# Patient Record
Sex: MALE | Race: ASIAN | Employment: FULL TIME | URBAN - METROPOLITAN AREA
[De-identification: names, ages, dates, MRNs, and addresses within clinical notes are randomized per-mention and may not be internally consistent; named-entity substitution may affect disease eponyms.]

---

## 2017-11-02 ENCOUNTER — HOSPITAL ENCOUNTER (OUTPATIENT)
Dept: PHYSICAL THERAPY | Age: 22
Discharge: HOME | End: 2017-11-02
Payer: COMMERCIAL

## 2017-11-02 PROCEDURE — 97161 PT EVAL LOW COMPLEX 20 MIN: CPT | Performed by: PHYSICAL THERAPIST

## 2017-11-02 NOTE — PROGRESS NOTES
[facility] PH[facility]ICAL [facility] - DAILY TREATMENT NOTE    Patient Name: Giorgi Bond        Date: 2017  : 1995   yes Patient  Verified  Visit #:   1   of   8  Insurance: Payor: Wilver Dubois / Plan: Jacinta Peace PPO / Product Type: PPO /      In time: 600 Out time: 630   Total Treatment Time: 30     Medicare Time Tracking (below)   Total Timed Codes (min):  na 1:1 Treatment Time:  na     TREATMENT AREA =  Low back pain [M54.5]  Right hip pain [M25.551]    SUBJECTIVE  Pain Level (on 0 to 10 scale):  ie  / 10   Medication Changes/New allergies or changes in medical history, any new surgeries or procedures?    no  If yes, update Summary List   Subjective Functional Status/Changes:  []  No changes reported     See ie          OBJECTIVE       min Patient Education:  yes  Reviewed HEP   []  Progressed/Changed HEP based on: Other Objective/Functional Measures:    Demo hep without an increase in pain  See ie     Post Treatment Pain Level (on 0 to 10) scale:   ie  / 10     ASSESSMENT  Assessment/Changes in Function:     See ie     []  See Progress Note/Recertification   Patient will continue to benefit from skilled PT services to modify and progress therapeutic interventions, address functional mobility deficits, address ROM deficits, address strength deficits, analyze and address soft tissue restrictions, analyze and cue movement patterns, analyze and modify body mechanics/ergonomics, assess and modify postural abnormalities and instruct in home and community integration to attain remaining goals.    Progress toward goals / Updated goals:    See ie     PLAN  []  Upgrade activities as tolerated yes Continue plan of care   []  Discharge due to :    []  Other:      Therapist: Vinnie Burgess PT    Date: 2017 Time: 6:37 PM     Future Appointments  Date Time Provider Jake Steel   2017 5:30 PM Krystina Phan PTA [facility]   2017 4:30 PM Krystina Phan PTA [facility]   2017 5:00 PM Christina Moreno PT [facility]   11/28/2017 5:00 PM Janine Huggins, PT [facility]   11/29/2017 4:30 PM Renu Davis, PTA [facility]

## 2017-11-08 ENCOUNTER — HOSPITAL ENCOUNTER (OUTPATIENT)
Dept: PHYSICAL THERAPY | Age: 22
Discharge: HOME | End: 2017-11-08
Payer: COMMERCIAL

## 2017-11-08 PROCEDURE — 97140 MANUAL THERAPY 1/> REGIONS: CPT

## 2017-11-08 PROCEDURE — 97110 THERAPEUTIC EXERCISES: CPT

## 2017-11-09 NOTE — PROGRESS NOTES
PHYSICAL THERAPY - DAILY TREATMENT NOTE    Patient Name: Raudel Camacho        Date: 2017  : 1995   YES Patient  Verified  Visit #:   2   of   12  Insurance: Payor: Jocelin Heredia / Plan: Eddi Leija PPO / Product Type: PPO /      In time: 525 Out time: 625   Total Treatment Time: 60     Medicare Time Tracking (below)   Total Timed Codes (min):  50 1:1 Treatment Time:       TREATMENT AREA =  Low back pain [M54.5]  Right hip pain [M25.551]    SUBJECTIVE  Pain Level (on 0 to 10 scale):  2  / 10   Medication Changes/New allergies or changes in medical history, any new surgeries or procedures? NO    If yes, update Summary List   Subjective Functional Status/Changes:  []  No changes reported     Pain reported @ mid low back, into (R) side and lateral hip. OBJECTIVE  Modalities Rationale:     decrease pain and increase tissue extensibility to improve patient's ability to perform pain free ADLs. min [] Estim, type/location:                                      []  att     []  unatt     []  w/US     []  w/ice    []  w/heat    min []  Mechanical Traction: type/lbs                   []  pro   []  sup   []  int   []  cont    []  before manual    []  after manual    min []  Ultrasound, settings/location:      min []  Iontophoresis w/ dexamethasone, location:                                               []  take home patch       []  in clinic   10 min []  Ice     [x]  Heat    location/position: Prone, lumbar. min []  Vasopneumatic Device, press/temp:     min []  Other:    [x] Skin assessment post-treatment (if applicable):    [x]  intact    []  redness- no adverse reaction     []redness  adverse reaction:        30 min Therapeutic Exercise:  [x]  See flow sheet   Rationale:      increase ROM, increase strength and improve coordination to improve the patients ability to perform pain free ADLs. 20 Min Manual Therapy: STM/DTM and TPR (R) lumbar paraspinals, glute/piriformis.     Rationale:      decrease pain, increase ROM, increase tissue extensibility and decrease trigger points to improve patient's ability to perform pain free ADLs. min Patient Education:  YES  Reviewed HEP   []  Progressed/Changed HEP based on: Other Objective/Functional Measures: Added multiple exercises to improve LE strength and stability for ADLs. Post Treatment Pain Level (on 0 to 10) scale:   2  / 10     ASSESSMENT  Assessment/Changes in Function:     TTP (B) lumbar paraspinals. Reports compliance with HEP. []  See Progress Note/Recertification   Patient will continue to benefit from skilled PT services to modify and progress therapeutic interventions, address functional mobility deficits, address ROM deficits, address strength deficits, analyze and address soft tissue restrictions, analyze and cue movement patterns, analyze and modify body mechanics/ergonomics and assess and modify postural abnormalities to attain remaining goals. Progress toward goals / Updated goals:    Initiated therex.       PLAN  [x]  Upgrade activities as tolerated YES Continue plan of care   []  Discharge due to :    []  Other:      Therapist: Cirilo Chavez PTA    Date: 11/8/2017 Time: 7:09 PM     Future Appointments  Date Time Provider Jake Steel   11/20/2017 4:30 PM Alexa Knapp [facility]   11/21/2017 5:00 PM Carlos Merchant PT [facility]   11/28/2017 5:00 PM Carlos Merchant PT [facility]   11/29/2017 4:30 PM Cirilo Chavez PTA [facility]

## 2017-11-20 ENCOUNTER — HOSPITAL ENCOUNTER (OUTPATIENT)
Dept: PHYSICAL THERAPY | Age: 22
Discharge: HOME | End: 2017-11-20
Payer: COMMERCIAL

## 2017-11-20 PROCEDURE — 97110 THERAPEUTIC EXERCISES: CPT

## 2017-11-20 PROCEDURE — 97140 MANUAL THERAPY 1/> REGIONS: CPT

## 2017-11-20 NOTE — PROGRESS NOTES
PHYSICAL THERAPY - DAILY TREATMENT NOTE    Patient Name: Zeus Christopher        Date: 2017  : 1995   YES Patient  Verified  Visit #:   3   of   12  Insurance: Payor: [id number] MEGHAN Patterson. / Plan: Aleman Lyons PPO / Product Type: PPO /      In time: 430 Out time: 515   Total Treatment Time: 45     Medicare Time Tracking (below)   Total Timed Codes (min):  35 1:1 Treatment Time:       TREATMENT AREA =  Low back pain [M54.5]  Right hip pain [M25.551]    SUBJECTIVE  Pain Level (on 0 to 10 scale):  0  / 10   Medication Changes/New allergies or changes in medical history, any new surgeries or procedures? NO    If yes, update Summary List   Subjective Functional Status/Changes:  []  No changes reported     Pt reports recent visit with MD. Manuela Rodrigez that all his symptoms are coming from issues in his back. Pt wants to wait ~1 month before looking at other treatment options. OBJECTIVE  Modalities Rationale:     decrease pain and increase tissue extensibility to improve patient's ability to perform pain free ADLs. min [] Estim, type/location:                                      []  att     []  unatt     []  w/US     []  w/ice    []  w/heat    min []  Mechanical Traction: type/lbs                   []  pro   []  sup   []  int   []  cont    []  before manual    []  after manual    min []  Ultrasound, settings/location:      min []  Iontophoresis w/ dexamethasone, location:                                               []  take home patch       []  in clinic   10 min []  Ice     [x]  Heat    location/position: Prone, lumbar. min []  Vasopneumatic Device, press/temp:     min []  Other:    [x] Skin assessment post-treatment (if applicable):    [x]  intact    []  redness- no adverse reaction     []redness  adverse reaction:        25 min Therapeutic Exercise:  [x]  See flow sheet   Rationale:      increase ROM, increase strength and improve coordination to improve the patients ability to perform pain free ADLs. 10 Min Manual Therapy: STM/DTM (B) lumbar paraspinals. Rationale:      decrease pain, increase ROM, increase tissue extensibility and decrease trigger points to improve patient's ability to perform pain free ADLs. min Patient Education:  YES  Reviewed HEP   []  Progressed/Changed HEP based on: Other Objective/Functional Measures: Therex per flow sheet. Post Treatment Pain Level (on 0 to 10) scale:   0  / 10     ASSESSMENT  Assessment/Changes in Function:     No exacerbation of symptoms with today's session. []  See Progress Note/Recertification   Patient will continue to benefit from skilled PT services to modify and progress therapeutic interventions, address functional mobility deficits, address ROM deficits, address strength deficits, analyze and address soft tissue restrictions, analyze and cue movement patterns, analyze and modify body mechanics/ergonomics and assess and modify postural abnormalities to attain remaining goals. Progress toward goals / Updated goals:    Pt reporting 0/10 pain. Progressing toward pain goals.       PLAN  [x]  Upgrade activities as tolerated YES Continue plan of care   []  Discharge due to :    []  Other:      Therapist: Arabella Lopez PTA    Date: 11/20/2017 Time: 4:36 PM     Future Appointments  Date Time Provider Jake Steel   11/21/2017 5:00 PM Ramya Linton PT [facility]   11/28/2017 5:00 PM Ramya Linton PT [facility]   11/29/2017 4:30 PM Arabella Lopez PTA [facility]

## 2017-11-21 ENCOUNTER — HOSPITAL ENCOUNTER (OUTPATIENT)
Dept: PHYSICAL THERAPY | Age: 22
Discharge: HOME | End: 2017-11-21
Payer: COMMERCIAL

## 2017-11-21 PROCEDURE — 97110 THERAPEUTIC EXERCISES: CPT

## 2017-11-21 PROCEDURE — 97140 MANUAL THERAPY 1/> REGIONS: CPT

## 2017-11-21 NOTE — PROGRESS NOTES
PHYSICAL THERAPY - DAILY TREATMENT NOTE    Patient Name: Pamela Streeter        Date: 2017  : 1995   YES Patient  Verified  Visit #:      12  Insurance: Payor: Daniel Alejandra / Plan: [employer] PPO / Product Type: PPO /      In time: 500 Out time: 555   Total Treatment Time: 55     Medicare Time Tracking (below)   Total Timed Codes (min):  na 1:1 Treatment Time:  na     TREATMENT AREA =  Low back pain [M54.5]  Right hip pain [M25.551]    SUBJECTIVE  Pain Level (on 0 to 10 scale):  2  / 10   Medication Changes/New allergies or changes in medical history, any new surgeries or procedures? NO    If yes, update Summary List   Subjective Functional Status/Changes:  []  No changes reported     Patient reports the front of his hip is bothering him today and isn't feeling any lower back symptoms. Patient denies significant complications since his LV. OBJECTIVE  Modalities Rationale:     decrease inflammation, decrease pain and increase tissue extensibility to improve patient's ability to perform work activities. min [] Estim, type/location:                                      []  att     []  unatt     []  w/US     []  w/ice    []  w/heat    min []  Mechanical Traction: type/lbs                   []  pro   []  sup   []  int   []  cont    []  before manual    []  after manual    min []  Ultrasound, settings/location:      min []  Iontophoresis w/ dexamethasone, location:                                               []  take home patch       []  in clinic   10 min []  Ice     [x]  Heat    location/position:  To lumbar spine in supine with bolster    min []  Vasopneumatic Device, press/temp:     min []  Other:    [x] Skin assessment post-treatment (if applicable):    [x]  intact    []  redness- no adverse reaction     []redness  adverse reaction:         35 min Therapeutic Exercise:  [x]  See flow sheet   Rationale:      increase ROM, increase strength, improve coordination and increase proprioception to improve the patients ability to perform walking activities. 10 min Manual Therapy: STM to R RF, R TFL, R glute med   Rationale:      decrease pain, increase ROM, increase tissue extensibility and decrease trigger points to improve patient's ability to perform standing activities. min Patient Education:  YES  Reviewed HEP   []  Progressed/Changed HEP based on: Other Objective/Functional Measures:    Patient presenting with significant tenderness and apprehension to palpation of R RF during MT  Good tolerance to current exercise program, occasional cuing required to maintain neutral spine position     Post Treatment Pain Level (on 0 to 10) scale:   0  / 10     ASSESSMENT  Assessment/Changes in Function:     Patient reported improved symptoms post session. Patient would benefit from progression of program in order to improve tolerance to ADL's.      []  See Progress Note/Recertification   Patient will continue to benefit from skilled PT services to modify and progress therapeutic interventions, address functional mobility deficits, address ROM deficits, address strength deficits, analyze and address soft tissue restrictions, analyze and cue movement patterns, analyze and modify body mechanics/ergonomics and assess and modify postural abnormalities to attain remaining goals.    Progress toward goals / Updated goals:    Progressing toward STG#2     PLAN  [x]  Upgrade activities as tolerated YES Continue plan of care   []  Discharge due to :    []  Other:      Therapist: Lita Oro PT    Date: 11/21/2017 Time: 6:36 PM     Future Appointments  Date Time Provider Jake Steel   11/28/2017 5:00 PM Lita Oro PT [facility]   11/29/2017 4:30 PM Herman Meyer PTA [facility]   12/4/2017 3:30 PM Graham Sanchez [facility]   12/6/2017 5:30 PM Herman Meyer PTA [facility]   12/11/2017 4:30 PM Herman Meyer [facility]   12/13/2017 5:00 PM Herman Meyre, [facility]

## 2017-11-28 ENCOUNTER — HOSPITAL ENCOUNTER (OUTPATIENT)
Dept: PHYSICAL THERAPY | Age: 22
Discharge: HOME | End: 2017-11-28
Payer: COMMERCIAL

## 2017-11-28 PROCEDURE — 97140 MANUAL THERAPY 1/> REGIONS: CPT

## 2017-11-28 PROCEDURE — 97110 THERAPEUTIC EXERCISES: CPT

## 2017-11-28 NOTE — PROGRESS NOTES
PHYSICAL THERAPY - DAILY TREATMENT NOTE    Patient Name: Vanessa Aguirre        Date: 2017  : 1995   YES Patient  Verified  Visit #:      of   12  Insurance: Payor: Yue Rivera / Plan: Katlin Benson PPO / Product Type: PPO /      In time: 455 Out time: 558   Total Treatment Time: 63     Medicare Time Tracking (below)   Total Timed Codes (min):  na 1:1 Treatment Time:  na     TREATMENT AREA =  Low back pain [M54.5]  Right hip pain [M25.551]    SUBJECTIVE  Pain Level (on 0 to 10 scale):  0  / 10   Medication Changes/New allergies or changes in medical history, any new surgeries or procedures? NO    If yes, update Summary List   Subjective Functional Status/Changes:  []  No changes reported     Patient reports relief after his last session, however little carry over between treatments. Patient states his symptoms continue to occur throughout the day with minimal changes in intensity. OBJECTIVE  Modalities Rationale:     decrease inflammation, decrease pain and increase tissue extensibility to improve patient's ability to perform transfers.     min [] Estim, type/location:                                      []  att     []  unatt     []  w/US     []  w/ice    []  w/heat    min []  Mechanical Traction: type/lbs                   []  pro   []  sup   []  int   []  cont    []  before manual    []  after manual    min []  Ultrasound, settings/location:      min []  Iontophoresis w/ dexamethasone, location:                                               []  take home patch       []  in clinic   10 min []  Ice     [x]  Heat    location/position: To R hip in supine with bolster    min []  Vasopneumatic Device, press/temp:     min []  Other:    [x] Skin assessment post-treatment (if applicable):    [x]  intact    []  redness- no adverse reaction     []redness  adverse reaction:        37 min Therapeutic Exercise:  [x]  See flow sheet   Rationale:      increase ROM, increase strength, improve coordination and increase proprioception to improve the patients ability to perform walking activities. 16 min Manual Therapy: STM R RF, R glute med, R TFL, R piriformis   Rationale:      decrease pain, increase ROM, increase tissue extensibility and decrease trigger points to improve patient's ability to perform standing activities. min Patient Education:  YES  Reviewed HEP   []  Progressed/Changed HEP based on: Other Objective/Functional Measures: Added supine PF stretch and mini band F/B and lateral this session for improved hip strength/stability and mobility; patient symptoms may correlate to hip tendinitis/tendinosis and would benefit from additional PT to address these deficits. Post Treatment Pain Level (on 0 to 10) scale:   3  / 10     ASSESSMENT  Assessment/Changes in Function:     Patient reported increased pain this session related to progression of his program. Plan to continue gradual progression of program per patient tolerance to address remaining symptoms. []  See Progress Note/Recertification   Patient will continue to benefit from skilled PT services to modify and progress therapeutic interventions, address functional mobility deficits, address ROM deficits, address strength deficits, analyze and address soft tissue restrictions, analyze and cue movement patterns, analyze and modify body mechanics/ergonomics, assess and modify postural abnormalities and address imbalance/dizziness to attain remaining goals.    Progress toward goals / Updated goals:    No significant progress with LTG's this session     PLAN  [x]  Upgrade activities as tolerated YES Continue plan of care   []  Discharge due to :    []  Other:      Therapist: Lita Oro PT    Date: 11/28/2017 Time: 5:53 PM     Future Appointments  Date Time Provider Jake Steel   11/29/2017 4:30 PM Herman Meyer PTA [place] [id number]   12/4/2017 3:30 PM Lita Oro PT [address]   12/6/2017 5:30 PM Herman Meyer PTA [facility]   12/11/2017 4:30 PM Gale Hyatt PTA [facility]   12/13/2017 5:00 PM Gale Hyatt PTA [facility]

## 2017-11-29 ENCOUNTER — HOSPITAL ENCOUNTER (OUTPATIENT)
Dept: PHYSICAL THERAPY | Age: 22
Discharge: HOME | End: 2017-11-29
Payer: COMMERCIAL

## 2017-11-29 PROCEDURE — 97110 THERAPEUTIC EXERCISES: CPT

## 2017-11-29 PROCEDURE — 97140 MANUAL THERAPY 1/> REGIONS: CPT

## 2017-11-29 NOTE — PROGRESS NOTES
PHYSICAL THERAPY - DAILY TREATMENT NOTE    Patient Name: Kalpana Yoder        Date: 2017  : 1995   YES Patient  Verified  Visit #:      12  Insurance: Payor: Howie Herring / Plan: Marly Holland PPO / Product Type: PPO /      In time: 430 Out time: 530   Total Treatment Time: 60     Medicare Time Tracking (below)   Total Timed Codes (min):  50 1:1 Treatment Time:       TREATMENT AREA =  Low back pain [M54.5]  Right hip pain [M25.551]    SUBJECTIVE  Pain Level (on 0 to 10 scale):  3-4  / 10   Medication Changes/New allergies or changes in medical history, any new surgeries or procedures? NO    If yes, update Summary List   Subjective Functional Status/Changes:  []  No changes reported     Pt says he may have spent more time on his feet than usual today, so his pain is higher. OBJECTIVE  Modalities Rationale:     decrease pain and increase tissue extensibility to improve patient's ability to perform pain free ADLs. min [] Estim, type/location:                                      []  att     []  unatt     []  w/US     []  w/ice    []  w/heat    min []  Mechanical Traction: type/lbs                   []  pro   []  sup   []  int   []  cont    []  before manual    []  after manual    min []  Ultrasound, settings/location:      min []  Iontophoresis w/ dexamethasone, location:                                               []  take home patch       []  in clinic   10 min []  Ice     [x]  Heat    location/position: Supine, (R) hip.     min []  Vasopneumatic Device, press/temp:     min []  Other:    [x] Skin assessment post-treatment (if applicable):    [x]  intact    []  redness- no adverse reaction     []redness  adverse reaction:        35 min Therapeutic Exercise:  [x]  See flow sheet   Rationale:      increase ROM, increase strength, improve coordination and improve balance to improve the patients ability to perform pain free ADLs.       15 min Manual Therapy: STM/DTM and TPR (R) glute/piriformis. Rationale:      decrease pain, increase ROM, increase tissue extensibility and decrease trigger points to improve patient's ability to perform pain free ADLs. min Patient Education:  YES  Reviewed HEP   []  Progressed/Changed HEP based on: Other Objective/Functional Measures: Added lateral tap downs on 4\" block. Pt reports increase in knee pain. Post Treatment Pain Level (on 0 to 10) scale:   3  / 10     ASSESSMENT  Assessment/Changes in Function:     Pt requires VC's for multiple exercises. Increase in reported pain levels with no known cause per pt.      []  See Progress Note/Recertification   Patient will continue to benefit from skilled PT services to modify and progress therapeutic interventions, address functional mobility deficits, address ROM deficits, address strength deficits, analyze and address soft tissue restrictions, analyze and cue movement patterns, analyze and modify body mechanics/ergonomics and assess and modify postural abnormalities to attain remaining goals. Progress toward goals / Updated goals:    No change in progress toward LTG's with today's session.        PLAN  [x]  Upgrade activities as tolerated YES Continue plan of care   []  Discharge due to :    []  Other:      Therapist: Gale Hyatt PTA    Date: 11/29/2017 Time: 4:52 PM     Future Appointments  Date Time Provider Jake Steel   12/4/2017 3:30 PM Devonte Wise, PT [facility]   12/6/2017 5:30 PM Gale Hyatt PTA [facility]   12/11/2017 4:30 PM Gale Hyatt PTA [facility]   12/13/2017 5:00 PM Gale Hyatt PTA [facility]

## 2017-12-06 ENCOUNTER — HOSPITAL ENCOUNTER (OUTPATIENT)
Dept: PHYSICAL THERAPY | Age: 22
Discharge: HOME | End: 2017-12-06
Payer: COMMERCIAL

## 2017-12-06 PROCEDURE — 97110 THERAPEUTIC EXERCISES: CPT

## 2017-12-06 NOTE — PROGRESS NOTES
[facility] PHYSICAL THERAPY  [address] [place] [address],[place], [address] [facility] - Phone: [phone]  Fax: [phone]  PROGRESS NOTE  Patient Name: Yanira Mario : 1995   Treatment/Medical Diagnosis: Low back pain [M54.5]  Right hip pain [M25.551]   Referral Source: Marta Centeno MD     Date of Initial Visit: 2017 Attended Visits: 7 Missed Visits: 1     SUMMARY OF TREATMENT  PT has consisted of initial evaluation, therapeutic exercises, HEP, manual therapy, patient education, and modalities. CURRENT STATUS  [facility] thigh pain with insidious onset. Pt currently reports 4-5/10 max pain. Cites good relief with PT sessions, however minimal carry over noted with continued pain while performing ADLs. Current FOTO = 54, GROC = 0. Pt is reporting partial compliance with HEP, stating he does the exercises \"every other day or so. \"      Goal/Measure of Progress Goal Met? 1. Pt will note daily max pain </=3/10 in order to increase participation in adls   Status at last Eval: Na  Current Status: 4-5/10 max pain progressing   2. Pt will report +3 on GROC in order to show functional improvement   Status at last Eval: Na  Current Status: 0  no   3. Pt will be I with advanced hep in order to prepare for dc   Status at last Eval: HEP established Current Status: Partial compliance reported progressing     New Goals to be achieved in __2-4__  weeks:  1. Pt will note daily max pain </=3/10 in order to increase participation in adls  2. Pt will report +3 on GROC in order to show functional improvement   3. Pt will be I with advanced hep in order to prepare for dc  RECOMMENDATIONS  Pt has made minimal progress with regards to improvement in functional ability, participation with HEP, and pain levels. Pt has 2 remaining visits. Please advise. If you have any questions/comments please contact us directly at [phone]. Thank you for allowing us to assist in the care of your patient. LPTA Signature: Jose Luis Baron PTA  Date: 12/6/2017   PT Signature:  Time: 6:42 PM   NOTE TO PHYSICIAN:  PLEASE COMPLETE THE ORDERS BELOW AND FAX TO   [facility] Physical Therapy: [phone]  If you are unable to process this request in 24 hours please contact our office: [phone]    ___ I have read the above report and request that my patient continue as recommended.   ___ I have read the above report and request that my patient continue therapy with the following changes/special instructions:_________________________________________________________   ___ I have read the above report and request that my patient be discharged from therapy.      Physician Signature:        Date:       Time:

## 2017-12-06 NOTE — PROGRESS NOTES
PHYSICAL THERAPY - DAILY TREATMENT NOTE    Patient Name: Carrie Swartz        Date: 2017  : 1995   YES Patient  Verified  Visit #:     Insurance: Payor: Massimo Julien / Plan: Gail Macedo PPO / Product Type: PPO /      In time: 530 Out time: 620   Total Treatment Time: 50     Medicare Time Tracking (below)   Total Timed Codes (min):  40 1:1 Treatment Time:       TREATMENT AREA =  Low back pain [M54.5]  Right hip pain [M25.551]    SUBJECTIVE  Pain Level (on 0 to 10 scale):  0-1  / 10   Medication Changes/New allergies or changes in medical history, any new surgeries or procedures? NO    If yes, update Summary List   Subjective Functional Status/Changes:  []  No changes reported     4-5/10 recent max pain. Says symptoms are about the same since beginning PT. States he's doing his exercises at home every other day or so. OBJECTIVE  Modalities Rationale:     decrease pain and increase tissue extensibility to improve patient's ability to perform pain free ADLs. min [] Estim, type/location:                                      []  att     []  unatt     []  w/US     []  w/ice    []  w/heat    min []  Mechanical Traction: type/lbs                   []  pro   []  sup   []  int   []  cont    []  before manual    []  after manual    min []  Ultrasound, settings/location:      min []  Iontophoresis w/ dexamethasone, location:                                               []  take home patch       []  in clinic   10 min []  Ice     [x]  Heat    location/position: Prone, lumbar. min []  Vasopneumatic Device, press/temp:     min []  Other:    [x] Skin assessment post-treatment (if applicable):    [x]  intact    []  redness- no adverse reaction     []redness  adverse reaction:        40 min Therapeutic Exercise:  [x]  See flow sheet   Rationale:      increase ROM, increase strength, improve coordination and improve balance to improve the patients ability to perform pain free ADLs.        min Patient Education:  YES  Reviewed HEP   []  Progressed/Changed HEP based on: Other Objective/Functional Measures: Therex per flow sheet. FOTO = 54   GROC = 0     Post Treatment Pain Level (on 0 to 10) scale:   3  / 10     ASSESSMENT  Assessment/Changes in Function:     See PN     []  See Progress Note/Recertification   Patient will continue to benefit from skilled PT services to modify and progress therapeutic interventions, address functional mobility deficits, address ROM deficits, address strength deficits, analyze and address soft tissue restrictions, analyze and cue movement patterns, analyze and modify body mechanics/ergonomics and assess and modify postural abnormalities to attain remaining goals.    Progress toward goals / Updated goals:    See PN     PLAN  [x]  Upgrade activities as tolerated YES Continue plan of care   []  Discharge due to :    []  Other:      Therapist: Silvia Albrecht PTA    Date: 12/6/2017 Time: 5:36 PM     Future Appointments  Date Time Provider Jake Steel   12/11/2017 4:30 PM Silvia Albrecht PTA [facility]   12/13/2017 5:00 PM Silvia Albrecht PTA [facility]

## 2017-12-11 ENCOUNTER — HOSPITAL ENCOUNTER (OUTPATIENT)
Dept: PHYSICAL THERAPY | Age: 22
Discharge: HOME | End: 2017-12-11
Payer: COMMERCIAL

## 2017-12-11 PROCEDURE — 97140 MANUAL THERAPY 1/> REGIONS: CPT

## 2017-12-11 PROCEDURE — 97110 THERAPEUTIC EXERCISES: CPT

## 2017-12-11 NOTE — PROGRESS NOTES
PHYSICAL THERAPY - DAILY TREATMENT NOTE    Patient Name: Neel Sen        Date: 2017  : 1995   YES Patient  Verified  Visit #:     Insurance: Payor: Francesca Hinkle / Plan: Guerita Llanes PPO / Product Type: PPO /      In time: 430 Out time: 525   Total Treatment Time: 55     Medicare Time Tracking (below)   Total Timed Codes (min):  45 1:1 Treatment Time:       TREATMENT AREA =  Low back pain [M54.5]  Right hip pain [M25.551]    SUBJECTIVE  Pain Level (on 0 to 10 scale):  0  / 10   Medication Changes/New allergies or changes in medical history, any new surgeries or procedures? NO    If yes, update Summary List   Subjective Functional Status/Changes:  []  No changes reported     \"I'm not really feeling any trouble today. \"  States he didn't do much today. OBJECTIVE  Modalities Rationale:     decrease pain and increase tissue extensibility to improve patient's ability to perform pain free ADLs. min [] Estim, type/location:                                      []  att     []  unatt     []  w/US     []  w/ice    []  w/heat    min []  Mechanical Traction: type/lbs                   []  pro   []  sup   []  int   []  cont    []  before manual    []  after manual    min []  Ultrasound, settings/location:      min []  Iontophoresis w/ dexamethasone, location:                                               []  take home patch       []  in clinic   10 min []  Ice     [x]  Heat    location/position: Supine, lumbar. min []  Vasopneumatic Device, press/temp:     min []  Other:    [x] Skin assessment post-treatment (if applicable):    [x]  intact    []  redness- no adverse reaction     []redness  adverse reaction:        35 min Therapeutic Exercise:  [x]  See flow sheet   Rationale:      increase ROM, increase strength and improve coordination to improve the patients ability to perform pain free ADLs. 10 min Manual Therapy: TPR (R) glute/piriformis, lumbar paraspinals.     Rationale: decrease pain, increase ROM, increase tissue extensibility and decrease trigger points to improve patient's ability to perform pain free ADLs. min Patient Education:  YES  Reviewed HEP   []  Progressed/Changed HEP based on: Other Objective/Functional Measures: Therex per flow sheet. Post Treatment Pain Level (on 0 to 10) scale:   3  / 10     ASSESSMENT  Assessment/Changes in Function:     No exacerbation of symptoms with today's session. []  See Progress Note/Recertification   Patient will continue to benefit from skilled PT services to modify and progress therapeutic interventions, address functional mobility deficits, address ROM deficits, address strength deficits, analyze and address soft tissue restrictions, analyze and cue movement patterns, analyze and modify body mechanics/ergonomics and assess and modify postural abnormalities to attain remaining goals. Progress toward goals / Updated goals:    No change in progress toward LTG's with today's session.       PLAN  [x]  Upgrade activities as tolerated YES Continue plan of care   []  Discharge due to :    []  Other:      Therapist: Silvia Albrecht PTA    Date: 12/11/2017 Time: 4:42 PM     Future Appointments  Date Time Provider Jake Steel   12/13/2017 5:00 PM Silvia Albrecht PTA [facility]

## 2017-12-13 ENCOUNTER — HOSPITAL ENCOUNTER (OUTPATIENT)
Dept: PHYSICAL THERAPY | Age: 22
Discharge: HOME | End: 2017-12-13
Payer: COMMERCIAL

## 2017-12-13 PROCEDURE — 97110 THERAPEUTIC EXERCISES: CPT

## 2017-12-13 PROCEDURE — 97140 MANUAL THERAPY 1/> REGIONS: CPT

## 2017-12-13 NOTE — PROGRESS NOTES
PHYSICAL THERAPY - DAILY TREATMENT NOTE    Patient Name: Kwame August        Date: 2017  : 1995   YES Patient  Verified  Visit #:     Insurance: Payor: Teresita Del Valle / Plan: Shanon Hernandez PPO / Product Type: PPO /      In time: 5 Out time: 535   Total Treatment Time: 35     Medicare Time Tracking (below)   Total Timed Codes (min):  25 1:1 Treatment Time:       TREATMENT AREA =  Low back pain [M54.5]  Right hip pain [M25.551]    SUBJECTIVE  Pain Level (on 0 to 10 scale):  0  / 10   Medication Changes/New allergies or changes in medical history, any new surgeries or procedures? NO    If yes, update Summary List   Subjective Functional Status/Changes:  []  No changes reported     Pt reporting minimal pain lately. OBJECTIVE    25 min Therapeutic Exercise:  [x]  See flow sheet   Rationale:      increase ROM, increase strength, improve coordination and improve balance to improve the patients ability to perform pain free ADLs. 10 min Manual Therapy: TPR (R) lumbar paraspinals, glute/piriformis. Rationale:      decrease pain, increase ROM, increase tissue extensibility and decrease trigger points to improve patient's ability to perform pain free ADLs. min Patient Education:  YES  Reviewed HEP   []  Progressed/Changed HEP based on: Other Objective/Functional Measures: Therex per flow sheet. Post Treatment Pain Level (on 0 to 10) scale:   0   / 10     ASSESSMENT  Assessment/Changes in Function:     No exacerbation of symptoms with today's session.       []  See Progress Note/Recertification   Patient will continue to benefit from skilled PT services to modify and progress therapeutic interventions, address functional mobility deficits, address ROM deficits, address strength deficits, analyze and address soft tissue restrictions, analyze and cue movement patterns, analyze and modify body mechanics/ergonomics and assess and modify postural abnormalities to attain remaining goals. Progress toward goals / Updated goals:    Progressing toward LTG #1 with reduction in pain levels. PLAN  [x]  Upgrade activities as tolerated YES Continue plan of care   []  Discharge due to :    []  Other:      Therapist: Jeet Grover PTA    Date: 12/13/2017 Time: 5:16 PM     No future appointments.

## 2018-01-18 NOTE — PROGRESS NOTES
[address] PHYSICAL THERAPY  [address] [place] [address], [place] [address],[facility], [facility] - Phone: [phone]  Fax: [phone] SUMMARY  Patient Name: Yanira Mario : 1995   Treatment/Medical Diagnosis: Low back pain [M54.5]  Right hip pain [M25.551]   Referral Source: Marta Centeno MD     Date of Initial Visit: 17 Attended Visits: 9 Missed Visits: 1     SUMMARY OF TREATMENT  PT has consisted of initial evaluation, therapeutic exercises, HEP, manual therapy, patient education, and modalities. CURRENT STATUS  [facility] (R) ant thigh pain with insidious onset. Pt currently reports 4-5/10 max pain. Cites good relief with PT sessions, however minimal carry over noted with continued pain while performing ADLs. Current FOTO = 54 (5 pt decrease from IE), GROC = 0. Pt is reporting partial compliance with HEP, stating he does the exercises \"every other day or so. \"  Pt did not return for further visits following session on . DC at this time. Goal/Measure of Progress Goal Met? 1. Pt will note daily max pain </=3/10 in order to increase participation in adls   Status at last Eval: 4-5/10 max  Current Status: Na  n/a   2. Pt will report +3 on GROC in order to show functional improvement    Status at last Eval: 0 Current Status: 0 no   3. Pt will be I with advanced hep in order to prepare for dc   Status at last Eval: Partial compliance with HEP Current Status: Partial compliance with HEP no       RECOMMENDATIONS  Discontinue therapy due to lack of appreciable progress towards goals. If you have any questions/comments please contact us directly at [phone]. Thank you for allowing us to assist in the care of your patient.     LPTA Signature: Drake Paez PTA Date: 2017   Therapist Signature:  Time: 11:12 AM